# Patient Record
Sex: FEMALE | Race: WHITE | Employment: FULL TIME | URBAN - METROPOLITAN AREA
[De-identification: names, ages, dates, MRNs, and addresses within clinical notes are randomized per-mention and may not be internally consistent; named-entity substitution may affect disease eponyms.]

---

## 2024-08-12 ENCOUNTER — INITIAL CONSULT (OUTPATIENT)
Age: 44
End: 2024-08-12
Payer: COMMERCIAL

## 2024-08-12 VITALS
BODY MASS INDEX: 17.85 KG/M2 | HEIGHT: 62 IN | HEART RATE: 58 BPM | DIASTOLIC BLOOD PRESSURE: 78 MMHG | WEIGHT: 97 LBS | SYSTOLIC BLOOD PRESSURE: 110 MMHG

## 2024-08-12 DIAGNOSIS — Z82.79 FAMILY HISTORY OF BICUSPID AORTIC VALVE: ICD-10-CM

## 2024-08-12 DIAGNOSIS — I51.89 DIASTOLIC DYSFUNCTION: Primary | ICD-10-CM

## 2024-08-12 DIAGNOSIS — Z82.49 FAMILY HISTORY OF CORONARY ARTERY DISEASE IN FATHER: ICD-10-CM

## 2024-08-12 PROCEDURE — 93000 ELECTROCARDIOGRAM COMPLETE: CPT | Performed by: INTERNAL MEDICINE

## 2024-08-12 PROCEDURE — 99244 OFF/OP CNSLTJ NEW/EST MOD 40: CPT | Performed by: INTERNAL MEDICINE

## 2024-08-12 RX ORDER — HYDROQUINONE 40 MG/G
CREAM TOPICAL
COMMUNITY
Start: 2024-07-02

## 2024-08-12 RX ORDER — DROSPIRENONE AND ETHINYL ESTRADIOL 0.02-3(28)
1 KIT ORAL DAILY
COMMUNITY
Start: 2024-07-08

## 2024-08-12 NOTE — PROGRESS NOTES
noted in all exposed joints.  Skin: No significant rashes noted the of the exposed regions.       Medical problems and test results were reviewed with the patient today.     No results found for this or any previous visit (from the past 672 hour(s)).  No results found for: \"CHOL\", \"CHOLPOCT\", \"CHLST\", \"CHOLV\", \"HDL\", \"HDLPOC\", \"HDLC\", \"LDL\", \"VLDLC\", \"VLDL\",hemoglobin, basic metabolic panel, No results found for: \"TSH\", \"TSH2\", \"TSH3\" ,No results found for: \"NA\", \"K\", \"CL\", \"CO2\", \"GLU\", \"BUN\", \"GFRAA\", \"TP\", \"GLOB\", \"ALT\", \"AST\"   No results found for: \"LDLDIRECT\"   No results found for: \"CREATININE\" No results found for: \"HGB\" No results found for: \"PLT\"     -EKG from 8/12/2024 shows sinus cardia with a heart rate of 58 bpm, poor R wave progression, normal variant personally reviewed with her.    ASSESSMENT and PLAN        Diastolic dysfunction  -     EKG 12 Lead  -Echo from June 2024 with preserved LV systolic function with an EF at 60% and reported grade 1 diastolic dysfunction-normal for age-not concerning in any way.  Reassured with these findings.  It is just a normal variant.  -The likelihood of this progressing in the absence of any significant structural heart disease/valvular heart disease/cardiomyopathy/drug exposure was extremely small.  -She will get in touch with her sooner if she has any worsening dyspnea exertion orthopnea PND or any true heart failure-like symptoms.    Family history of coronary artery disease in father  -     EKG 12 Lead  She will continue to monitor her lipids and sugars over time.  She is a non-smoker, remains very active at this point.  No strong reason to consider an ischemia evaluation in the absence of symptoms.  After the age of 50, CT coronary calcium score can be considered.    Family history of bicuspid aortic valve  -Daughter with this.  She herself does not have a bicuspid aortic valve-aortic valve was trileaflet and there is no evidence of a thoracic aortic aneurysm